# Patient Record
Sex: FEMALE | Race: WHITE | HISPANIC OR LATINO | Employment: FULL TIME | URBAN - METROPOLITAN AREA
[De-identification: names, ages, dates, MRNs, and addresses within clinical notes are randomized per-mention and may not be internally consistent; named-entity substitution may affect disease eponyms.]

---

## 2023-06-05 ENCOUNTER — PROCEDURE VISIT (OUTPATIENT)
Dept: OBGYN CLINIC | Facility: CLINIC | Age: 43
End: 2023-06-05

## 2023-06-05 VITALS
BODY MASS INDEX: 27.48 KG/M2 | DIASTOLIC BLOOD PRESSURE: 72 MMHG | SYSTOLIC BLOOD PRESSURE: 127 MMHG | HEART RATE: 57 BPM | HEIGHT: 60 IN | WEIGHT: 140 LBS

## 2023-06-05 DIAGNOSIS — R87.612 LOW GRADE SQUAMOUS INTRAEPITHELIAL LESION ON CYTOLOGIC SMEAR OF CERVIX (LGSIL): Primary | ICD-10-CM

## 2023-06-05 DIAGNOSIS — Z23 NEED FOR HPV VACCINATION: ICD-10-CM

## 2023-06-05 LAB — SL AMB POCT URINE HCG: NEGATIVE

## 2023-06-05 PROCEDURE — 90471 IMMUNIZATION ADMIN: CPT | Performed by: OBSTETRICS & GYNECOLOGY

## 2023-06-05 PROCEDURE — 57454 BX/CURETT OF CERVIX W/SCOPE: CPT | Performed by: OBSTETRICS & GYNECOLOGY

## 2023-06-05 PROCEDURE — 90651 9VHPV VACCINE 2/3 DOSE IM: CPT | Performed by: OBSTETRICS & GYNECOLOGY

## 2023-06-05 PROCEDURE — 88305 TISSUE EXAM BY PATHOLOGIST: CPT | Performed by: PATHOLOGY

## 2023-06-05 PROCEDURE — 81025 URINE PREGNANCY TEST: CPT | Performed by: OBSTETRICS & GYNECOLOGY

## 2023-06-05 NOTE — PROGRESS NOTES
[facility]  PROCEDURE VISIT    SUBJECTIVE:  HPI: Mai Cuba is a 38 y o   female who presents for colposcopy  Prior cervical cancer screening history:  10/3/2016: Pap - ASCUS, HPV Other HR +  11/3/2016: Colpo - Negative    3/6/2018: HSIL/AGC, HPV Other HR +  3/22/2018: Colpo - SOCO I, SOCO II/SOCO III  2018: LEEP - SOCO II/III   6/10/2020: Pap - ASCUS, cannot exclude HSIL; HPV Other HR +  2020: Colpo - SOCO II  2020: LEEP - SOCO I, SOCO II/III  2021: NILM, HPV no result   4/10/23: LSIL, HPV neg    She is not a smoker  She is using Mirena IUD for contraception  She is not previously HPV vaccinated  Past Medical History:   Diagnosis Date   • Abnormal Pap smear of cervix    • Bleeding gums    • Dental caries    • HPV (human papilloma virus) infection      Past Surgical History:   Procedure Laterality Date   • CERVICAL BIOPSY  W/ LOOP ELECTRODE EXCISION     • COLPOSCOPY     • DE COLPOSCOPY CERVIX VAG LOOP ELTRD BX CERVIX N/A 2018    Procedure: BIOPSY LEEP CERVIX;  Surgeon: Darius Aguilar MD;  [facility];  Service: Gynecology   • DE COLPOSCOPY CERVIX VAG LOOP ELTRD BX CERVIX N/A 2020    Procedure: BIOPSY LEEP CERVIX;  Surgeon: Dina March MD;  Location: [facility];  Service: Gynecology   • REMOVAL OF INTRAUTERINE DEVICE (IUD) N/A 2020    Procedure: REMOVAL OF INTRAUTERINE DEVICE (IUD); Surgeon: Dina March MD;  Location: [facility];  Service: Gynecology         OBJECTIVE:  Vitals:    23 0802   BP: 127/72   Pulse: 57   Weight: 63 5 kg (140 lb)   Height: 5' (1 524 m)       Physical Exam  Vitals reviewed  Constitutional:       General: She is not in acute distress  Appearance: She is well-developed  HENT:      Head: Normocephalic and atraumatic  Eyes:      Conjunctiva/sclera: Conjunctivae normal    Cardiovascular:      Rate and Rhythm: Normal rate     Pulmonary:      Effort: Pulmonary effort is normal  Abdominal:      General: There is no distension  Palpations: Abdomen is soft  Tenderness: There is no abdominal tenderness  Genitourinary:     Comments: Normal appearing external genitalia, vaginal mucosa  Evidence of cervix almost flush with the vaginal apex  No evidence of mass, lesion, or injury  No evidence of vaginal bleeding  IUD strings visualized  Normal physiologic discharge in the vaginal vault  Musculoskeletal:         General: Normal range of motion  Skin:     General: Skin is warm and dry  Neurological:      General: No focal deficit present  Mental Status: She is alert and oriented to person, place, and time  Mental status is at baseline  Psychiatric:         Mood and Affect: Mood normal          Behavior: Behavior normal          Thought Content: Thought content normal          Recent Results (from the past 12 hour(s))   POCT urine HCG    Collection Time: 06/05/23  8:14 AM   Result Value Ref Range    URINE HCG negative           Colposcopy     Date/Time 6/5/2023 8:11 AM     [place] Protocol   Procedure performed by: (Dr Sisi Bonilla)  Consent: Verbal consent obtained  Written consent obtained    Risks and benefits: risks, benefits and alternatives were discussed  Consent given by: patient  Patient understanding: patient states understanding of the procedure being performed  Patient consent: the patient's understanding of the procedure matches consent given  Procedure consent: procedure consent matches procedure scheduled  Relevant documents: relevant documents present and verified  Test results: test results available and properly labeled  Required items: required blood products, implants, devices, and special equipment available  Patient identity confirmed: verbally with patient       Performed by  Philip Cramen MD   Authorized by Philip Carmen MD       Pre-procedure details     Prepped with: acetic acid       Indication    LSIL (LSIL, HPV neg)     Procedure Details Procedure: Colposcopy w/ cervical biopsy and ECC      Under satisfactory analgesia the patient was prepped and draped in the dorsal lithotomy position: yes      Humble speculum was placed in the vagina: yes      Under colposcopic examination the transition zone was seen in entirety: no      Endocervix was curetted using a Kevorkian curette: yes      Cervical biopsy performed with a cervical biopsy punch: yes      Monsel's solution was applied: yes      Biopsy(s): yes      Location:  3:00, 6:00, 9:00, 12:00     Specimen to pathology: yes       Post-procedure      Findings: White epithelium      Impression: Low grade cervical dysplasia      Patient tolerance of procedure: Tolerated well, no immediate complications     Comments       Very difficult colposcopy due to redundant vaginal wall tissue and cervix almost flush with the vaginal apex  IUD strings visualized, inadvertently trimmed during procedure during attempts to obtain biopsies  Still with IUD strings visible outside the cervix  ASSESSMENT:  Sudarshan Reid is a 38 y o   female who presents for colposcpy  Tolerated well  Difficult procedure due to hx LEEP x 2 and cervix almost flush with the apex  Further management pending results  Patient is not previously HPV vaccinated  Would be interested in receiving vaccine today       PLAN:  - HPV vaccine today; RTC for 2nd and 3rd doses   - F/u colposcopy results     Jan Cho MD   PGY-4, OBGYN  23

## 2023-06-09 PROCEDURE — 88305 TISSUE EXAM BY PATHOLOGIST: CPT | Performed by: PATHOLOGY

## 2023-07-17 ENCOUNTER — HOSPITAL ENCOUNTER (OUTPATIENT)
Dept: MAMMOGRAPHY | Facility: IMAGING CENTER | Age: 43
Discharge: HOME | End: 2023-07-17

## 2023-07-17 VITALS — BODY MASS INDEX: 26.7 KG/M2 | HEIGHT: 60 IN | WEIGHT: 136 LBS

## 2023-07-17 DIAGNOSIS — Z12.31 ENCOUNTER FOR SCREENING MAMMOGRAM FOR MALIGNANT NEOPLASM OF BREAST: ICD-10-CM

## 2023-07-17 PROCEDURE — 77067 SCR MAMMO BI INCL CAD: CPT

## 2023-07-17 PROCEDURE — 77063 BREAST TOMOSYNTHESIS BI: CPT

## 2023-08-07 ENCOUNTER — CLINICAL SUPPORT (OUTPATIENT)
Dept: OBGYN CLINIC | Facility: CLINIC | Age: 43
End: 2023-08-07

## 2023-08-07 DIAGNOSIS — Z23 NEED FOR HPV VACCINATION: Primary | ICD-10-CM

## 2023-08-07 PROCEDURE — 90651 9VHPV VACCINE 2/3 DOSE IM: CPT

## 2023-08-07 PROCEDURE — 90471 IMMUNIZATION ADMIN: CPT

## 2023-10-16 ENCOUNTER — OFFICE VISIT (OUTPATIENT)
Dept: DENTISTRY | Facility: CLINIC | Age: 43
End: 2023-10-16

## 2023-10-16 DIAGNOSIS — K02.62 CARIES OF DENTIN: Primary | ICD-10-CM

## 2023-10-16 PROCEDURE — D2392 RESIN-BASED COMPOSITE - 2 SURFACES, POSTERIOR: HCPCS

## 2023-10-16 NOTE — DENTAL PROCEDURE DETAILS
Composite Filling #5 DO  Pt presents for a dental restoration and verbally consents for treatment:    Reviewed health history-  Pt is ASA type I   Pain Scale: 0/10- no pain reported    Discussed with patient need for RCT if pulp exposure occurs or in future if pulp is inflamed. Pt understands and consents. Dx:  #5 DO caries    Tx:  Applied topical benzocaine, administered 0.5 carpule 4% Septocaine 1:100k epi via B Infiltration    #5 DO:  Prepped tooth #19 MO with 245 carbide on high speed. Caries removed with round carbide on slow speed. Placed tofflemire matrix. Isolation with cotton rolls. Limelite cured. Etch with 37% H2PO4, rinse, dry. Applied Adhese with 20 second scrub once, gentle air dry and light cured for 10s. Restored with Tetric bulk meagan shade A2 and light cured. Refined with finishing burs, polished with enhance point. Verified occlusion. Pt left satisfied and ambulatory.     Attending: Dr Yvette Puentes    NV:  Recall

## 2023-10-16 NOTE — DENTAL PROCEDURE DETAILS
Patient presents for a dental restoration and verbally consents for treatment:  Reviewed health history-  Pt is ASA type {Milo numeral i-vi:67389}  Treatment consents signed: Yes  Perio: {WIS Healthy/Gingivitis/Periodontitis:097771911}  Pain Scale: {NUMBERS 0-10:14743}  Caries Assessment: {Low, Medium, MOLI:96309}    Radiographs: Films are current  Oral Hygiene instruction reviewed and given  Hygiene recall visits recommended to the patient    Patient agrees with the diagnosis of Caries and the proposed treatment plan for the resin restoration:  Tooth #{dnt general tooth numbers 1-32:20101}  Dental Anesthesia:  1.7 ml 3% carbo no epi. Material:   Etch Ivoclar bond and resin   Shade: {dnt shade:20085}    Prognosis is Good.    Referrals Needed: No  Next visit: Visit date not found

## 2023-11-27 ENCOUNTER — OFFICE VISIT (OUTPATIENT)
Dept: DENTISTRY | Facility: CLINIC | Age: 43
End: 2023-11-27

## 2023-11-27 VITALS — HEART RATE: 51 BPM | DIASTOLIC BLOOD PRESSURE: 68 MMHG | SYSTOLIC BLOOD PRESSURE: 114 MMHG

## 2023-11-27 DIAGNOSIS — K02.62 CARIES OF DENTIN: Primary | ICD-10-CM

## 2023-11-27 PROCEDURE — D2392 RESIN-BASED COMPOSITE - 2 SURFACES, POSTERIOR: HCPCS

## 2023-11-27 NOTE — DENTAL PROCEDURE DETAILS
Jered Rdz is a 42 y/o F that presents to [facility] for #3-MO dental restoration and verbally consents for treatment:  Reviewed health history-  Pt is ASA type II  Treatment consents signed: Yes  Perio: Localized Periodontitis  Pain Scale: 0  Caries Assessment: High    Radiographs: Films are current (FMX 7/27/22)  Oral Hygiene instruction reviewed and given  Hygiene recall visits recommended to the patient    Patient agrees with the diagnosis of Caries and the proposed treatment plan for the resin restoration: #3-MO composite resin   Discussed with patient need for RCT if pulp exposure occurs or in future if pulp is inflamed. Pt understands and consents. Applied topical benzocaine, administered 0.5 carps 4% articaine 1:100k epi via buccal local infiltration. Prepped tooth #3-MO with 245 carbide on high speed. Caries removed with round carbide on slow speed. No contact is present. Isolation with cotton rolls. Etch with 37% H2PO4, rinse, dry. Applied Adhese with 20 second scrub once, gentle air dry and light cured for 10s. Restored with Tetric flowable and bulk meagan shade A2 and light cured. Refined with finishing burs, polished with enhance point. Verified occlusion. Patient left satisfied and ambulatory. Attending: Dr. Ronald Pratt  NV: 6 [facility], prophylaxis (patient says she is due for prophy)    GILLIAN Newby

## 2023-12-11 ENCOUNTER — CLINICAL SUPPORT (OUTPATIENT)
Dept: OBGYN CLINIC | Facility: CLINIC | Age: 43
End: 2023-12-11

## 2023-12-11 DIAGNOSIS — Z23 NEED FOR HPV VACCINATION: Primary | ICD-10-CM

## 2023-12-11 PROCEDURE — 90471 IMMUNIZATION ADMIN: CPT

## 2023-12-11 PROCEDURE — 90651 9VHPV VACCINE 2/3 DOSE IM: CPT

## 2023-12-28 ENCOUNTER — OFFICE VISIT (OUTPATIENT)
Dept: DENTISTRY | Facility: CLINIC | Age: 43
End: 2023-12-28

## 2023-12-28 VITALS — DIASTOLIC BLOOD PRESSURE: 73 MMHG | HEART RATE: 50 BPM | SYSTOLIC BLOOD PRESSURE: 115 MMHG

## 2023-12-28 DIAGNOSIS — Z01.21 ENCOUNTER FOR DENTAL EXAMINATION AND CLEANING WITH ABNORMAL FINDINGS: Primary | ICD-10-CM

## 2023-12-28 PROCEDURE — D4910 PERIODONTAL MAINTENANCE: HCPCS

## 2023-12-28 PROCEDURE — D0120 PERIODIC ORAL EVALUATION - ESTABLISHED PATIENT: HCPCS

## 2023-12-28 PROCEDURE — D0274 BITEWINGS - 4 RADIOGRAPHIC IMAGES: HCPCS

## 2023-12-28 NOTE — DENTAL PROCEDURE DETAILS
PERIODIC EXAM, PERIO MT, 4BWX   REVIEWED MED HX: meds, allergies, health changes reviewed in [facility]. All consents signed.  CHIEF CONCERN:  Interested in tooth replacement #4 and #19  PAIN SCALE:  0  ASA CLASS:  2  PLAQUE:  mild      CALCULUS:  moderate, areas of black subcalc  BLEEDING:   moderate loc where calc present  STAIN :   light  ORAL HYGIENE:   fair    PERIO: updated probes, loc 4-6mm around 2nd and 3rd molars. 3rd molars are unerupted but just under gingival margin.    Hand scaled, polished and flossed. Used Cavitron.     Oral Hygiene Instruction:  recommended brushing 2 x daily for 2 minutes MIN, recommended flossing daily, reviewed dietary precautions.  Dispensed: toothbrush, toothpaste and floss    Visual and Tactile Intraoral/ Extraoral evaluation: Oral and Oropharyngeal cancer evaluation. No findings     Dr. Newby  exam=   Reviewed with patient clinical and radiographic findings and patient verbalized understanding. All questions and concerns addressed.     REFERRALS: no referrals needed at this time    CARIES FINDINGS:   2 O  15 MO       TREATMENT  PLAN :   1) 15 MO, discuss tooth replacement w/ Dr Newby  2) 2 O  Next Recall: 4 month recall     Last BWX: 12-28-23  Last Panorex/ FMX : 7-27-22

## 2024-03-25 ENCOUNTER — OFFICE VISIT (OUTPATIENT)
Dept: DENTISTRY | Facility: CLINIC | Age: 44
End: 2024-03-25

## 2024-03-25 VITALS — DIASTOLIC BLOOD PRESSURE: 82 MMHG | HEART RATE: 50 BPM | SYSTOLIC BLOOD PRESSURE: 119 MMHG

## 2024-03-25 DIAGNOSIS — K02.9 DENTAL CARIES: Primary | ICD-10-CM

## 2024-03-25 PROCEDURE — D2392 RESIN-BASED COMPOSITE - 2 SURFACES, POSTERIOR: HCPCS

## 2024-03-25 NOTE — DENTAL PROCEDURE DETAILS
Patient presents for a dental restoration and verbally consents for treatment:  Reviewed health history-  Pt is ASA type II  Treatment consents signed: Yes  Perio: Periodontitis  Pain Scale: 0  Caries Assessment: High    Radiographs: Films are current    Informed pt of size of caries and warned that it is very deep. Warned of possible risk of pulp inflammation causing RCT. Pt understands.     Patient agrees with the diagnosis of Caries and the proposed treatment plan for the resin restoration:  Tooth ##15 MO  Dental Anesthesia:  1.7 ml 2% Lidocaine w/1:100k epi given PSA block.  Dry Shield placed  Prepared ideal class 2 prep with occlusal dovetail using 557 bur on high speed. Used slow speed #8 round bur to excavate decay on walls and pulpal floor. Refined prep for retention and resistance form.     Tofflemire and wedge placed  Material: Limelight placed on deepest portion on pulpal floor and cured. Selective acid etch and rinse, Ivoclar maxwell scrubbed in and cured, and Tetric evoflow and evoceram resin packed and cured in increments   Shade: Shade A2  Checked occlusion and contacts and refined with finishing burs    Prognosis is Guarded.   Referrals Needed: No  Next visit: 4/1/2024 Everardo Carrasco DDS  #2 Occlusal resin

## 2024-04-01 ENCOUNTER — OFFICE VISIT (OUTPATIENT)
Dept: DENTISTRY | Facility: CLINIC | Age: 44
End: 2024-04-01

## 2024-04-01 VITALS — TEMPERATURE: 98.6 F

## 2024-04-01 DIAGNOSIS — Z01.21 ENCOUNTER FOR DENTAL EXAMINATION AND CLEANING WITH ABNORMAL FINDINGS: Primary | ICD-10-CM

## 2024-04-01 PROCEDURE — D2391 RESIN-BASED COMPOSITE - 1 SURFACE, POSTERIOR: HCPCS | Performed by: DENTIST

## 2024-04-01 NOTE — DENTAL PROCEDURE DETAILS
Patient presents for a dental restoration and verbally consents for treatment:  Reviewed health history-  Pt is ASA type II  Treatment consents signed: Yes  Perio: Healthy  Pain Scale: 0  Caries Assessment: High    Radiographs: Films are current  Oral Hygiene instruction reviewed and given  Hygiene recall visits recommended to the patient    Patient agrees with the diagnosis of Caries and the proposed treatment plan for the resin restoration:  Tooth ##2  Dental Anesthesia: No.  Material:   Etch Ivoclar bond and resin   Shade: Shade A2    Prognosis is Good.   Referrals Needed: No  Next visit: Recall visits

## 2024-04-01 NOTE — PROGRESS NOTES
Patient presents for a dental restoration and verbally consents for treatment:  Reviewed health history-  Pt is ASA type II  Treatment consents signed: Yes  Perio: Healthy  Pain Scale: 0  Caries Assessment: High    Radiographs: Films are current  Oral Hygiene instruction reviewed and given  Hygiene recall visits recommended to the patient     Patient agrees with the diagnosis of Caries and the proposed treatment plan for the resin restoration:  Tooth ##2 (O)  Dental Anesthesia: No.  Material:   Etch Ivoclar bond and resin   Shade: Shade A2   Post op instructions given  Prognosis is Good.   Referrals Needed: No  Next visit: Recall visits

## 2024-05-20 ENCOUNTER — OFFICE VISIT (OUTPATIENT)
Dept: DENTISTRY | Facility: CLINIC | Age: 44
End: 2024-05-20

## 2024-05-20 VITALS — DIASTOLIC BLOOD PRESSURE: 64 MMHG | HEART RATE: 68 BPM | SYSTOLIC BLOOD PRESSURE: 114 MMHG

## 2024-05-20 DIAGNOSIS — Z01.20 VISIT FOR DENTAL EXAMINATION: Primary | ICD-10-CM

## 2024-05-20 PROCEDURE — D0367 CONE BEAM CT CAPTURE AND INTERPRETATION WITH FIELD OF VIEW OF BOTH JAWS; WITH OR WITHOUT CRANIUM: HCPCS

## 2024-05-22 NOTE — DENTAL PROCEDURE DETAILS
Leonela Rodger Viraj presents to [facility] dental clinic for implant consultation. H&P reviewed with no changes. Pain 0/10, ASA class II.    Patient is primary interested in implant for site #4 which presents aesthetic concerns. She is concerned about the .     PA taken #4 showing no residual bony defect from extraction.     CBCT taken. Interpreted scan with Dr. Hallman. Patient has adequate bone for at least 3.8 x 10mm implant in this space.     Explained options for replacing this tooth to patient: implant vs bridge. Explained that implant is preferred option as teeth #s 3 and 5 are healthy, unrestored teeth and bridge preparation will require removing significant amount of tooth structure from these teeth which may shorten their lifespan or cause complications such as a pulpal exposure.     Patient would like to think about her options and will call to schedule implant if she is interested.    NV: Recall, implant when ready to proceed

## 2025-04-21 ENCOUNTER — OFFICE VISIT (OUTPATIENT)
Dept: INTERNAL MEDICINE CLINIC | Facility: CLINIC | Age: 45
End: 2025-04-21

## 2025-04-21 VITALS
WEIGHT: 153.2 LBS | HEART RATE: 65 BPM | DIASTOLIC BLOOD PRESSURE: 72 MMHG | SYSTOLIC BLOOD PRESSURE: 111 MMHG | TEMPERATURE: 98.5 F | BODY MASS INDEX: 30.08 KG/M2 | HEIGHT: 60 IN

## 2025-04-21 DIAGNOSIS — Z23 ENCOUNTER FOR IMMUNIZATION: ICD-10-CM

## 2025-04-21 DIAGNOSIS — M54.50 LOWER BACK PAIN: ICD-10-CM

## 2025-04-21 DIAGNOSIS — G47.00 INSOMNIA: ICD-10-CM

## 2025-04-21 DIAGNOSIS — R07.89 NON-CARDIAC CHEST PAIN: ICD-10-CM

## 2025-04-21 DIAGNOSIS — Z12.4 SCREENING FOR CERVICAL CANCER: ICD-10-CM

## 2025-04-21 DIAGNOSIS — M79.601 PAIN IN BOTH UPPER EXTREMITIES: ICD-10-CM

## 2025-04-21 DIAGNOSIS — Z11.59 NEED FOR HEPATITIS C SCREENING TEST: ICD-10-CM

## 2025-04-21 DIAGNOSIS — Z13.1 SCREENING FOR DIABETES MELLITUS: ICD-10-CM

## 2025-04-21 DIAGNOSIS — Z13.220 SCREENING FOR HYPERLIPIDEMIA: ICD-10-CM

## 2025-04-21 DIAGNOSIS — M79.602 PAIN IN BOTH UPPER EXTREMITIES: ICD-10-CM

## 2025-04-21 DIAGNOSIS — Z00.00 ANNUAL PHYSICAL EXAM: Primary | ICD-10-CM

## 2025-04-21 DIAGNOSIS — Z12.31 ENCOUNTER FOR SCREENING MAMMOGRAM FOR BREAST CANCER: ICD-10-CM

## 2025-04-21 PROCEDURE — 99386 PREV VISIT NEW AGE 40-64: CPT | Performed by: STUDENT IN AN ORGANIZED HEALTH CARE EDUCATION/TRAINING PROGRAM

## 2025-04-21 PROCEDURE — 90471 IMMUNIZATION ADMIN: CPT

## 2025-04-21 PROCEDURE — 90715 TDAP VACCINE 7 YRS/> IM: CPT

## 2025-04-21 PROCEDURE — 93000 ELECTROCARDIOGRAM COMPLETE: CPT | Performed by: STUDENT IN AN ORGANIZED HEALTH CARE EDUCATION/TRAINING PROGRAM

## 2025-04-21 RX ORDER — TRAZODONE HYDROCHLORIDE 50 MG/1
50 TABLET ORAL
Qty: 30 TABLET | Refills: 0 | Status: SHIPPED | OUTPATIENT
Start: 2025-04-21

## 2025-04-21 NOTE — PATIENT INSTRUCTIONS
"Patient Education     Routine physical for adults   The Basics   Written by the doctors and editors at [facility]   What is a physical? -- A physical is a routine visit, or \"check-up,\" with your doctor. You might also hear it called a \"wellness visit\" or \"preventive visit.\"  During each visit, the doctor will:   Ask about your physical and mental health   Ask about your habits, behaviors, and lifestyle   Do an exam   Give you vaccines if needed   Talk to you about any medicines you take   Give advice about your health   Answer your questions  Getting regular check-ups is an important part of taking care of your health. It can help your doctor find and treat any problems you have. But it's also important for preventing health problems.  A routine physical is different from a \"sick visit.\" A sick visit is when you see a doctor because of a health concern or problem. Since physicals are scheduled ahead of time, you can think about what you want to ask the doctor.  How often should I get a physical? -- It depends on your age and health. In general, for people age 21 years and older:   If you are younger than 50 years, you might be able to get a physical every 3 years.   If you are 50 years or older, your doctor might recommend a physical every year.  If you have an ongoing health condition, like diabetes or high blood pressure, your doctor will probably want to see you more often.  What happens during a physical? -- In general, each visit will include:   Physical exam - The doctor or nurse will check your height, weight, heart rate, and blood pressure. They will also look at your eyes and ears. They will ask about how you are feeling and whether you have any symptoms that bother you.   Medicines - It's a good idea to bring a list of all the medicines you take to each doctor visit. Your doctor will talk to you about your medicines and answer any questions. Tell them if you are having any side effects that bother you. You " "should also tell them if you are having trouble paying for any of your medicines.   Habits and behaviors - This includes:   Your diet   Your exercise habits   Whether you smoke, drink alcohol, or use drugs   Whether you are sexually active   Whether you feel safe at home  Your doctor will talk to you about things you can do to improve your health and lower your risk of health problems. They will also offer help and support. For example, if you want to quit smoking, they can give you advice and might prescribe medicines. If you want to improve your diet or get more physical activity, they can help you with this, too.   Lab tests, if needed - The tests you get will depend on your age and situation. For example, your doctor might want to check your:   Cholesterol   Blood sugar   Iron level   Vaccines - The recommended vaccines will depend on your age, health, and what vaccines you already had. Vaccines are very important because they can prevent certain serious or deadly infections.   Discussion of screening - \"Screening\" means checking for diseases or other health problems before they cause symptoms. Your doctor can recommend screening based on your age, risk, and preferences. This might include tests to check for:   Cancer, such as breast, prostate, cervical, ovarian, colorectal, prostate, lung, or skin cancer   Sexually transmitted infections, such as chlamydia and gonorrhea   Mental health conditions like depression and anxiety  Your doctor will talk to you about the different types of screening tests. They can help you decide which screenings to have. They can also explain what the results might mean.   Answering questions - The physical is a good time to ask the doctor or nurse questions about your health. If needed, they can refer you to other doctors or specialists, too.  Adults older than 65 years often need other care, too. As you get older, your doctor will talk to you about:   How to prevent falling at " home   Hearing or vision tests   Memory testing   How to take your medicines safely   Making sure that you have the help and support you need at home  All topics are updated as new evidence becomes available and our peer review process is complete.  This topic retrieved from [employer] on: May 02, 2024.  Topic 715710 Version 1.0  Release: 32.4.3 - C32.122  © 2024 UpToDate, Inc. and/or its affiliates. All rights reserved.  Consumer Information Use and Disclaimer   Disclaimer: This generalized information is a limited summary of diagnosis, treatment, and/or medication information. It is not meant to be comprehensive and should be used as a tool to help the user understand and/or assess potential diagnostic and treatment options. It does NOT include all information about conditions, treatments, medications, side effects, or risks that may apply to a specific patient. It is not intended to be medical advice or a substitute for the medical advice, diagnosis, or treatment of a health care provider based on the health care provider's examination and assessment of a patient's specific and unique circumstances. Patients must speak with a health care provider for complete information about their health, medical questions, and treatment options, including any risks or benefits regarding use of medications. This information does not endorse any treatments or medications as safe, effective, or approved for treating a specific patient. UpToDate, Inc. and its affiliates disclaim any warranty or liability relating to this information or the use thereof.The use of this information is governed by the Terms of Use, available at https://www.woltersAuthentidate Holdinguwer.com/en/know/clinical-effectiveness-terms. 2024© UpToDate, Inc. and its affiliates and/or licensors. All rights reserved.  Copyright   © 2024 UpToDate, Inc. and/or its affiliates. All rights reserved.

## 2025-04-21 NOTE — PROGRESS NOTES
Adult Annual Physical  Name: Leonela Cali      : 1980      MRN: [id number]  Encounter Provider: Jose Mosqueda MD  Encounter Date: 2025   Encounter department: [facility] [place]    :  Assessment & Plan  Annual physical exam         Pain in both upper extremities  Cramping and aching pain from C-spine radiating down both arms bilaterally.  No associating aggravating or alleviating factors.  Has tried over-the-counter NSAIDs without much relief.  No alarm symptoms on history.  Does have mild focal C spinous process tenderness.  No step off appreciated.  Strength 5/5 both upper extremities bilaterally.  Pain reproduced with strength testing.  Likely cervical spondylosis or radiculopathy but concern for fracture/spondylolysis given point tenderness on exam.  Other differential diagnoses include osteomyelitis, malignancy, epidural abscess although these are unlikely.  Will obtain CT cervical spine to assess for any bony pathology and obtain Mg, Phos, VitD to rule out any electrolyte/vitamin deficiency cause of pain.     Orders:    Magnesium; Future    Phosphorus; Future    Vitamin D 25 hydroxy; Future    CT spine cervical wo contrast; Future    TSH + Free T4; Future    Lower back pain  Midline lower back pain with radiation to bilateral lower extremities, similar to upper extremity pain.  Also with focal tenderness around L4-5 spinous process.  Will obtain XR lumbar spine to rule out fractures or bony pathology in addition to the above  Orders:    XR spine lumbar 2 or 3 views injury; Future    Screening for cervical cancer  History of LSIL and had colposcopy done in  and was directed to repeat in 1 year but did not follow-up.  Orders:    Ambulatory referral to Obstetrics / Gynecology; Future    Encounter for immunization    Orders:    Tdap Vaccine greater than or equal to 6yo    Need for hepatitis C screening test    Orders:    Hepatitis C Antibody;  Future    Encounter for screening mammogram for breast cancer    Orders:    Mammo screening bilateral w 3d and cad; Future    Insomnia  Reports waking up from sleep around midnight to 3 AM and unable to fall back asleep.  Has been taking Benadryl which only causes grogginess in the morning.  Has also tried melatonin without any benefit.  Does report significant stressors and anxiety in her life which may be contributing.  Recently started therapy for depression/anxiety symptoms.  Recommended trying low dose trazodone to see if this improves sleep.  And we will follow-up in about 6 weeks to reassess.  Orders:    traZODone (DESYREL) 50 mg tablet; Take 1 tablet (50 mg total) by mouth daily at bedtime    CBC and Platelet; Future    Comprehensive metabolic panel; Future    Vitamin B12/Folate, Serum Panel; Future    TSH + Free T4; Future    Non-cardiac chest pain  Random onset of nonradiating pressure-like chest pain lasting only a few minutes.  No correlation with exertion, position, deep breathing.  Not associated with lightheadedness, palpitations, shortness of breath. episodes began around the same time she started experiencing upper and lower extremity pain.  POC EKG showing sinus bradycardia but otherwise unremarkable.  CP may be MSK or anxiety related and is unlikely cardiac.  Orders:    POCT ECG    Screening for diabetes mellitus    Orders:    Hemoglobin A1C; Future    Screening for hyperlipidemia    Orders:    Lipid Panel with Direct LDL reflex; Future    Screening for cervical cancer         Encounter for immunization         Need for hepatitis C screening test         Encounter for screening mammogram for breast cancer         Annual physical exam  Leonela Cali is a 44-year-old female no significant past medical history apart from SOCO presenting to [facility] care.  She does report having difficulty staying asleep at night and often wakes up in the middle of the night with racing thoughts and is unable to  fall back asleep.  Additionally, reports bilateral upper and lower extremity pain for the past month which radiates from the spine to upper and lower extremities.  Describes this as a cramping and soreness type of pain.  Has been taking OTC NSAIDs without relief.  Pain is not constant but will occur randomly throughout the day and is not necessarily associated with activity.  She denies fevers/chills, recent illness, headache, palpitations, shortness of breath, GI symptoms.             Preventive Screenings:  - Diabetes Screening: risks/benefits discussed and orders placed  - Cholesterol Screening: risks/benefits discussed and orders placed   - Hepatitis C screening: risks/benefits discussed and orders placed   - HIV screening: screening up-to-date and orders placed   - Cervical cancer screening: orders placed   - Breast cancer screening: screening up-to-date   - Colon cancer screening: screening not indicated   - Lung cancer screening: screening not indicated     Immunizations:  - Immunizations due: Influenza    BMI Counseling: Body mass index is 29.92 kg/m². The BMI is above normal. Nutrition recommendations include decreasing portion sizes and moderation in carbohydrate intake. Exercise recommendations include moderate physical activity 150 minutes/week. No pharmacotherapy was ordered. Patient referred to PCP. Rationale for BMI follow-up plan is due to patient being overweight or obese.     Depression Screening and Follow-up Plan: Patient was screened for depression during today's encounter. They screened negative with a PHQ-2 score of 2.          History of Present Illness     Adult Annual Physical:  Patient presents for annual physical.     Diet and Physical Activity:  - Diet/Nutrition: no special diet.  - Exercise: no formal exercise.    Depression Screening:  - PHQ-2 Score: 2    General Health:  - Sleep: sleeps poorly.  - Hearing: normal hearing right ear and normal hearing left ear.  - Vision: no vision  problems.  - Dental: no dental visits for > 1 year.    /GYN Health:  - Follows with GYN: no.   - Menopause: premenopausal.   - Contraception: IUD placement.      Review of Systems   Constitutional:  Negative for activity change, chills, diaphoresis, fatigue, fever and unexpected weight change.   HENT:  Negative for sinus pressure, tinnitus and trouble swallowing.    Eyes:  Negative for visual disturbance.   Respiratory:  Negative for cough, chest tightness, shortness of breath and wheezing.    Cardiovascular:  Positive for chest pain. Negative for palpitations and leg swelling.   Gastrointestinal:  Negative for abdominal distention, abdominal pain, blood in stool, constipation, diarrhea and vomiting.   Genitourinary:  Negative for difficulty urinating and dysuria.   Musculoskeletal:  Positive for back pain and neck pain.   Neurological:  Positive for light-headedness and headaches. Negative for tremors, weakness and numbness.   Psychiatric/Behavioral:  The patient is nervous/anxious.          Objective   /72 (BP Location: Right arm, Patient Position: Sitting, Cuff Size: Large)   Pulse 65   Temp 98.5 °F (36.9 °C) (Temporal)   Ht 5' (1.524 m)   Wt 69.5 kg (153 lb 3.2 oz)   BMI 29.92 kg/m²     Physical Exam  Constitutional:       Appearance: Normal appearance.   HENT:      Head: Normocephalic and atraumatic.      Right Ear: Tympanic membrane, ear canal and external ear normal.      Left Ear: Tympanic membrane, ear canal and external ear normal.      Nose: Nose normal. No congestion.      Mouth/Throat:      Mouth: Mucous membranes are moist.      Pharynx: Oropharynx is clear.   Eyes:      Extraocular Movements: Extraocular movements intact.      Conjunctiva/sclera: Conjunctivae normal.      Pupils: Pupils are equal, round, and reactive to light.   Cardiovascular:      Rate and Rhythm: Normal rate and regular rhythm.      Pulses: Normal pulses.      Heart sounds: Normal heart sounds.   Pulmonary:       Effort: Pulmonary effort is normal.      Breath sounds: Normal breath sounds.   Abdominal:      General: Abdomen is flat. Bowel sounds are normal.      Palpations: Abdomen is soft.   Musculoskeletal:         General: Normal range of motion.      Cervical back: Normal range of motion and neck supple. Tenderness present.      Lumbar back: Tenderness present.      Right lower leg: No edema.      Left lower leg: No edema.      Comments: Pain in biceps with flexion against resistance   Skin:     General: Skin is warm and dry.   Neurological:      General: No focal deficit present.      Mental Status: She is alert and oriented to person, place, and time.      Cranial Nerves: Cranial nerves 2-12 are intact.      Motor: No weakness.   Psychiatric:         Mood and Affect: Mood normal.         Behavior: Behavior normal.

## 2025-04-23 ENCOUNTER — APPOINTMENT (OUTPATIENT)
Dept: LAB | Facility: HOSPITAL | Age: 45
End: 2025-04-23
Payer: COMMERCIAL

## 2025-04-23 DIAGNOSIS — Z13.220 SCREENING FOR HYPERLIPIDEMIA: ICD-10-CM

## 2025-04-23 DIAGNOSIS — M79.602 PAIN IN BOTH UPPER EXTREMITIES: ICD-10-CM

## 2025-04-23 DIAGNOSIS — M79.601 PAIN IN BOTH UPPER EXTREMITIES: ICD-10-CM

## 2025-04-23 DIAGNOSIS — Z13.1 SCREENING FOR DIABETES MELLITUS: ICD-10-CM

## 2025-04-23 DIAGNOSIS — G47.00 INSOMNIA: ICD-10-CM

## 2025-04-23 DIAGNOSIS — Z11.59 NEED FOR HEPATITIS C SCREENING TEST: ICD-10-CM

## 2025-04-23 LAB
25(OH)D3 SERPL-MCNC: 21.5 NG/ML (ref 30–100)
ALBUMIN SERPL BCG-MCNC: 4.2 G/DL (ref 3.5–5)
ALP SERPL-CCNC: 58 U/L (ref 34–104)
ALT SERPL W P-5'-P-CCNC: 32 U/L (ref 7–52)
ANION GAP SERPL CALCULATED.3IONS-SCNC: 10 MMOL/L (ref 4–13)
AST SERPL W P-5'-P-CCNC: 19 U/L (ref 13–39)
BILIRUB SERPL-MCNC: 0.42 MG/DL (ref 0.2–1)
BUN SERPL-MCNC: 8 MG/DL (ref 5–25)
CALCIUM SERPL-MCNC: 9.1 MG/DL (ref 8.4–10.2)
CHLORIDE SERPL-SCNC: 107 MMOL/L (ref 96–108)
CHOLEST SERPL-MCNC: 190 MG/DL (ref ?–200)
CO2 SERPL-SCNC: 22 MMOL/L (ref 21–32)
CREAT SERPL-MCNC: 0.63 MG/DL (ref 0.6–1.3)
ERYTHROCYTE [DISTWIDTH] IN BLOOD BY AUTOMATED COUNT: 12.3 % (ref 11.6–15.1)
EST. AVERAGE GLUCOSE BLD GHB EST-MCNC: 108 MG/DL
FOLATE SERPL-MCNC: 21.7 NG/ML
GFR SERPL CREATININE-BSD FRML MDRD: 110 ML/MIN/1.73SQ M
GLUCOSE P FAST SERPL-MCNC: 99 MG/DL (ref 65–99)
HBA1C MFR BLD: 5.4 %
HCT VFR BLD AUTO: 40.9 % (ref 34.8–46.1)
HCV AB SER QL: NORMAL
HDLC SERPL-MCNC: 51 MG/DL
HGB BLD-MCNC: 13.7 G/DL (ref 11.5–15.4)
LDLC SERPL CALC-MCNC: 111 MG/DL (ref 0–100)
MAGNESIUM SERPL-MCNC: 2.2 MG/DL (ref 1.9–2.7)
MCH RBC QN AUTO: 31.4 PG (ref 26.8–34.3)
MCHC RBC AUTO-ENTMCNC: 33.5 G/DL (ref 31.4–37.4)
MCV RBC AUTO: 94 FL (ref 82–98)
PHOSPHATE SERPL-MCNC: 3 MG/DL (ref 2.7–4.5)
PLATELET # BLD AUTO: 334 THOUSANDS/UL (ref 149–390)
PMV BLD AUTO: 9.8 FL (ref 8.9–12.7)
POTASSIUM SERPL-SCNC: 4 MMOL/L (ref 3.5–5.3)
PROT SERPL-MCNC: 6.9 G/DL (ref 6.4–8.4)
RBC # BLD AUTO: 4.36 MILLION/UL (ref 3.81–5.12)
SODIUM SERPL-SCNC: 139 MMOL/L (ref 135–147)
T4 FREE SERPL-MCNC: 0.82 NG/DL (ref 0.61–1.12)
TRIGL SERPL-MCNC: 139 MG/DL (ref ?–150)
TSH SERPL DL<=0.05 MIU/L-ACNC: 1.63 UIU/ML (ref 0.45–4.5)
VIT B12 SERPL-MCNC: 158 PG/ML (ref 180–914)
WBC # BLD AUTO: 6.94 THOUSAND/UL (ref 4.31–10.16)

## 2025-04-23 PROCEDURE — 83036 HEMOGLOBIN GLYCOSYLATED A1C: CPT

## 2025-04-23 PROCEDURE — 36415 COLL VENOUS BLD VENIPUNCTURE: CPT

## 2025-04-23 PROCEDURE — 84100 ASSAY OF PHOSPHORUS: CPT

## 2025-04-23 PROCEDURE — 84439 ASSAY OF FREE THYROXINE: CPT

## 2025-04-23 PROCEDURE — 84443 ASSAY THYROID STIM HORMONE: CPT

## 2025-04-23 PROCEDURE — 82607 VITAMIN B-12: CPT

## 2025-04-23 PROCEDURE — 80061 LIPID PANEL: CPT

## 2025-04-23 PROCEDURE — 82746 ASSAY OF FOLIC ACID SERUM: CPT

## 2025-04-23 PROCEDURE — 80053 COMPREHEN METABOLIC PANEL: CPT

## 2025-04-23 PROCEDURE — 85027 COMPLETE CBC AUTOMATED: CPT

## 2025-04-23 PROCEDURE — 83735 ASSAY OF MAGNESIUM: CPT

## 2025-04-23 PROCEDURE — 86803 HEPATITIS C AB TEST: CPT

## 2025-04-23 PROCEDURE — 82306 VITAMIN D 25 HYDROXY: CPT

## 2025-04-24 ENCOUNTER — RESULTS FOLLOW-UP (OUTPATIENT)
Dept: INTERNAL MEDICINE CLINIC | Facility: CLINIC | Age: 45
End: 2025-04-24

## 2025-04-24 DIAGNOSIS — E55.9 VITAMIN D DEFICIENCY: ICD-10-CM

## 2025-04-24 DIAGNOSIS — E53.8 VITAMIN B12 DEFICIENCY: Primary | ICD-10-CM

## 2025-04-24 RX ORDER — LANOLIN ALCOHOL/MO/W.PET/CERES
1000 CREAM (GRAM) TOPICAL DAILY
Qty: 90 TABLET | Refills: 1 | Status: SHIPPED | OUTPATIENT
Start: 2025-04-24

## 2025-04-24 NOTE — RESULT ENCOUNTER NOTE
Attempted to call patient regarding lab results but call was unable to be completed.  Her B12 and vitamin D levels were low which may partially explain her arm and leg pains.  Sent prescription for supplements to her pharmacy.

## 2025-05-12 ENCOUNTER — HOSPITAL ENCOUNTER (OUTPATIENT)
Dept: CT IMAGING | Facility: HOSPITAL | Age: 45
Discharge: HOME | End: 2025-05-12
Attending: STUDENT IN AN ORGANIZED HEALTH CARE EDUCATION/TRAINING PROGRAM

## 2025-05-12 DIAGNOSIS — M79.601 PAIN IN BOTH UPPER EXTREMITIES: ICD-10-CM

## 2025-05-12 DIAGNOSIS — M79.602 PAIN IN BOTH UPPER EXTREMITIES: ICD-10-CM

## 2025-05-12 PROCEDURE — 72125 CT NECK SPINE W/O DYE: CPT

## 2025-05-19 ENCOUNTER — ANNUAL EXAM (OUTPATIENT)
Dept: OBGYN CLINIC | Facility: CLINIC | Age: 45
End: 2025-05-19

## 2025-05-19 VITALS
WEIGHT: 154.8 LBS | HEART RATE: 57 BPM | BODY MASS INDEX: 30.39 KG/M2 | SYSTOLIC BLOOD PRESSURE: 125 MMHG | RESPIRATION RATE: 18 BRPM | HEIGHT: 60 IN | DIASTOLIC BLOOD PRESSURE: 79 MMHG

## 2025-05-19 DIAGNOSIS — Z12.4 CERVICAL CANCER SCREENING: Primary | ICD-10-CM

## 2025-05-19 DIAGNOSIS — Z11.3 SCREENING EXAMINATION FOR STD (SEXUALLY TRANSMITTED DISEASE): ICD-10-CM

## 2025-05-19 PROCEDURE — 87591 N.GONORRHOEAE DNA AMP PROB: CPT

## 2025-05-19 PROCEDURE — G0476 HPV COMBO ASSAY CA SCREEN: HCPCS

## 2025-05-19 PROCEDURE — 87491 CHLMYD TRACH DNA AMP PROBE: CPT

## 2025-05-19 PROCEDURE — G0145 SCR C/V CYTO,THINLAYER,RESCR: HCPCS

## 2025-05-19 NOTE — PROGRESS NOTES
OB/GYN ANNUAL H&P    SUBJECTIVE:    Leonela Cali is a 44 y.o.  female who presents for annual well woman H&P.    GYN:  She does not have periods since being on the Mirena IUD  Abnormal vaginal discharge: denies  Genital lesions: denies  Genital irritation or itching: denies  Sexually active: yes with   Dyspareunia: denies  Contraception: Mirena IUD  STI history: denies  Gynecologic surgical history: LEEPx1    OB:   female  Prior pregnancies uncomplicated    :  Dysuria: denies  Hematuria: denies  Urgency: denies  Frequency: denies  Urinary incontinence: denies    Breast:  Mass: denies  Skin changes: denies  Reddening: denies  Dimpling: denies  Pain: denies  Nipple discharge: denies    General:  Diet: good, balanced  Exercise: weight lifts 2x a week, walks  Work: [employer]   Alcohol use: socially  Tobacco use: denies  Recreational drug use: denies    Family history:  Breast cancer: denies  Uterine cancer: denies  Ovarian cancer: denies  Colon: denies    Screening:  Cervical cancer screening hx as follows  ASCUS in  followed by neg colpo  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, followed by colpo which showed CINII-III  ASCUS in  with + HPV high risk, followed by colpo which showed SOCO II  Underwent LEEP in  that showed HGSIL with neg margins  NILM with failed HPV testing in   LGSIL in  with - HPV testing, followed by colpo that was neg for dysplacia  Pap smear performed today  Breast cancer screening:  Last mammogram on 2023 showed Birads 1  Due for first routine screening mammogram at 43 yo  Colon cancer screening:  Due for first routine screening colonoscopy at 48 yo  STI screening: desires GC/CT    Immunizations:  Gardasil: received 3x doses    Review of Systems:  Pertinent items are noted in HPI.    OBJECTIVE:    Vitals:   /79 (BP Location: Right arm, Patient Position: Sitting, Cuff Size: Adult)   Pulse 57   Resp 18   Ht 5' (1.524 m)   Wt 70.2 kg  (154 lb 12.8 oz)   LMP  (LMP Unknown)   BMI 30.23 kg/m²     Physical Exam  Constitutional:       Appearance: Normal appearance.   Genitourinary:      Vulva, rectum and urethral meatus normal.      No lesions in the vagina.      Right Labia: No rash, tenderness, lesions, skin changes or Bartholin's cyst.     Left Labia: No tenderness, lesions, skin changes, Bartholin's cyst or rash.     No labial fusion noted.      Pelvic Deep Score: 5/5.     No vaginal discharge, erythema, tenderness, bleeding, ulceration or granulation tissue.      No vaginal prolapse present.     No vaginal atrophy present.       Right Adnexa: not tender, not full, no mass present and not absent.     Left Adnexa: not tender, not full, no mass present and not absent.     No cervical motion tenderness, discharge, friability, lesion, polyp, nabothian cyst or eversion.      Uterus is not enlarged, fixed, tender, irregular or prolapsed.      No uterine mass detected.     Uterus is anteverted.   Breasts:     Deep Score is 5.      Breasts are symmetrical.      Breasts are soft.     Right: Normal. No swelling, bleeding, inverted nipple, mass, nipple discharge, skin change, tenderness or breast implant.      Left: Normal. No swelling, bleeding, inverted nipple, mass, nipple discharge, skin change, tenderness or breast implant.   HENT:      Head: Atraumatic.     Eyes:      Extraocular Movements: Extraocular movements intact.      Conjunctiva/sclera: Conjunctivae normal.       Cardiovascular:      Rate and Rhythm: Normal rate and regular rhythm.      Pulses: Normal pulses.   Pulmonary:      Effort: Pulmonary effort is normal. No respiratory distress.      Breath sounds: Normal breath sounds.   Abdominal:      General: Abdomen is flat. There is no distension.      Palpations: Abdomen is soft.      Tenderness: There is no abdominal tenderness.     Musculoskeletal:      Cervical back: Normal range of motion.   Lymphadenopathy:      Upper Body:      Right  upper body: No supraclavicular or axillary adenopathy.      Left upper body: No supraclavicular or axillary adenopathy.     Neurological:      General: No focal deficit present.      Mental Status: She is alert and oriented to person, place, and time.     Skin:     General: Skin is warm and dry.     Psychiatric:         Mood and Affect: Mood normal.         Behavior: Behavior normal.   Vitals reviewed. Exam conducted with a chaperone present.         ASSESSMENT:    Leonela Cali is a 44 y.o.  with normal gynecologic exam.    PLAN:    -Pap smear collected today  -S/p Gardasil   -Up to date with mammogram  -IUD in place  -GC/CT at patient's request  -Return back in 1 year or sooner if issues arise    Dr. Robison aware    Myra Hdez MD  25  11:22 AM

## 2025-05-20 ENCOUNTER — PATIENT OUTREACH (OUTPATIENT)
Facility: HOSPITAL | Age: 45
End: 2025-05-20

## 2025-05-20 ENCOUNTER — TELEPHONE (OUTPATIENT)
Dept: OTHER | Facility: OTHER | Age: 45
End: 2025-05-20

## 2025-05-20 LAB
HPV HR 12 DNA CVX QL NAA+PROBE: NEGATIVE
HPV16 DNA CVX QL NAA+PROBE: NEGATIVE
HPV18 DNA CVX QL NAA+PROBE: NEGATIVE

## 2025-05-20 NOTE — PROGRESS NOTES
"Program details reviewed (Yes/No):Yes    Patient lives in [place]: (Yes/No):Yes    Uninsured/Underinsured(List):Uninsured    Patient Agreeable to Participation(Yes/No):Yes    Verbal Consent Given (Yes/No):Yes    Adagio Consent form signed \"verba consent\"(Yes/No):Yes    Patient Entered into [facility] (Yes/[facility]      "

## 2025-05-20 NOTE — TELEPHONE ENCOUNTER
Progress Note:  Left message for patient to return call to assist in rescheduling mammogram appointment location due to Stacy Moralez.     Mammogram Screening ([facility]):  Pap smear screening (Clinic vs [facility] vs [facility]):  PCP (Clinic vs [facility] vs [facility]):     Transportation:     Education:

## 2025-05-21 LAB
C TRACH DNA SPEC QL NAA+PROBE: NEGATIVE
N GONORRHOEA DNA SPEC QL NAA+PROBE: NEGATIVE

## 2025-05-22 ENCOUNTER — TELEPHONE (OUTPATIENT)
Dept: OTHER | Facility: OTHER | Age: 45
End: 2025-05-22

## 2025-05-22 LAB
LAB AP GYN PRIMARY INTERPRETATION: NORMAL
Lab: NORMAL

## 2025-05-22 NOTE — TELEPHONE ENCOUNTER
Progress Note:  Phone call from patient. Education provided. Requested RN schedule mammogram appointment.    Mammogram Screening ([facility]):  Pap smear screening (Clinic vs [facility] vs [facility]):  [facility]     Transportation:     Education:

## 2025-06-23 ENCOUNTER — OFFICE VISIT (OUTPATIENT)
Dept: INTERNAL MEDICINE CLINIC | Facility: CLINIC | Age: 45
End: 2025-06-23

## 2025-06-23 VITALS
HEIGHT: 61 IN | WEIGHT: 155 LBS | RESPIRATION RATE: 18 BRPM | OXYGEN SATURATION: 97 % | TEMPERATURE: 98.4 F | HEART RATE: 60 BPM | DIASTOLIC BLOOD PRESSURE: 72 MMHG | BODY MASS INDEX: 29.27 KG/M2 | SYSTOLIC BLOOD PRESSURE: 111 MMHG

## 2025-06-23 DIAGNOSIS — E55.9 VITAMIN D DEFICIENCY: ICD-10-CM

## 2025-06-23 DIAGNOSIS — G47.00 INSOMNIA: ICD-10-CM

## 2025-06-23 DIAGNOSIS — E53.8 VITAMIN B12 DEFICIENCY: ICD-10-CM

## 2025-06-23 PROCEDURE — 99213 OFFICE O/P EST LOW 20 MIN: CPT

## 2025-06-23 RX ORDER — LANOLIN ALCOHOL/MO/W.PET/CERES
1000 CREAM (GRAM) TOPICAL DAILY
Qty: 90 TABLET | Refills: 1 | Status: SHIPPED | OUTPATIENT
Start: 2025-06-23

## 2025-06-23 RX ORDER — TRAZODONE HYDROCHLORIDE 50 MG/1
50 TABLET ORAL
Qty: 30 TABLET | Refills: 2 | Status: SHIPPED | OUTPATIENT
Start: 2025-06-23

## 2025-06-23 NOTE — PROGRESS NOTES
Name: Leonela Cali      : 1980      MRN: [id number]  Encounter Provider: Leland Bernabe DO  Encounter Date: 2025   Encounter department: [facility] [place]  :  Assessment & Plan  Insomnia  Patient with history of insomnia in which she wakes up from sleep around 3 AM and unable to fall back asleep  Patient was initially trying milligram without improvement  Patient tried melatonin without improvement  Patient was prescribed trazodone during the previous visit  Patient reported improvement  Will continue trazodone    Orders:    traZODone (DESYREL) 50 mg tablet; Take 1 tablet (50 mg total) by mouth daily at bedtime    Vitamin D deficiency  Patient has history of vitamin D deficiency  Continue vitamin D supplement  Orders:    Cholecalciferol (VITAMIN D3) 1,000 units tablet; Take 1 tablet (1,000 Units total) by mouth daily    Vitamin B12 deficiency  Patient with vitamin B12 deficiency  Continue with B12 supplement  Orders:    vitamin B-12 (VITAMIN B-12) 1,000 mcg tablet; Take 1 tablet (1,000 mcg total) by mouth daily           History of Present Illness   HPI  44-year-old female patient with no known past medical history who presents to clinic for follow-up.  Patient was seen recently in the clinic due to neck pain with radicular symptoms to the upper extremities.  Patient was found to have vitamin D and vitamin B12 deficiency.  CT cervical spine without acute abnormality.  Patient reports improvement.  Patient also was seen due to insomnia which she was prescribed trazodone with improvement.  Review of Systems   Constitutional:  Negative for activity change, appetite change, chills, diaphoresis, fatigue and fever.   HENT:  Negative for congestion, dental problem, drooling, facial swelling, mouth sores, nosebleeds, sinus pain, sneezing, sore throat and trouble swallowing.    Eyes:  Negative for pain, discharge, itching and visual disturbance.   Respiratory:  Negative  "for apnea, cough, choking, chest tightness and stridor.    Cardiovascular:  Negative for chest pain, palpitations and leg swelling.   Gastrointestinal:  Negative for blood in stool, constipation, diarrhea, rectal pain and vomiting.   Endocrine: Negative for heat intolerance and polyuria.   Genitourinary:  Negative for dyspareunia, enuresis, hematuria, pelvic pain and urgency.   Musculoskeletal:  Negative for arthralgias, joint swelling and myalgias.   Skin:  Negative for color change, pallor and rash.   Allergic/Immunologic: Negative for environmental allergies, food allergies and immunocompromised state.   Neurological:  Negative for dizziness, tremors, seizures, light-headedness and headaches.   Hematological:  Negative for adenopathy. Does not bruise/bleed easily.   Psychiatric/Behavioral:  Negative for decreased concentration, hallucinations and suicidal ideas. The patient is not hyperactive.        Objective   /72 (BP Location: Right arm, Patient Position: Sitting, Cuff Size: Standard)   Pulse 60   Temp 98.4 °F (36.9 °C) (Tympanic)   Resp 18   Ht 5' 1\" (1.549 m)   Wt 70.3 kg (155 lb)   SpO2 97%   BMI 29.29 kg/m²      Physical Exam  Constitutional:       General: She is not in acute distress.     Appearance: She is not ill-appearing, toxic-appearing or diaphoretic.   HENT:      Head: Normocephalic and atraumatic.     Cardiovascular:      Rate and Rhythm: Normal rate and regular rhythm.      Pulses: Normal pulses.      Heart sounds: Normal heart sounds. No murmur heard.     No friction rub. No gallop.   Pulmonary:      Effort: Pulmonary effort is normal. No respiratory distress.      Breath sounds: No stridor. No wheezing, rhonchi or rales.   Chest:      Chest wall: No tenderness.   Abdominal:      General: Abdomen is flat. There is no distension.      Palpations: There is no mass.      Tenderness: There is no abdominal tenderness. There is no guarding or rebound.      Hernia: No hernia is present. "     Musculoskeletal:         General: No swelling, tenderness, deformity or signs of injury. Normal range of motion.     Skin:     General: Skin is warm.      Coloration: Skin is not jaundiced or pale.      Findings: No bruising or erythema.     Neurological:      General: No focal deficit present.      Mental Status: She is alert.      Cranial Nerves: No cranial nerve deficit.      Sensory: No sensory deficit.      Motor: No weakness.      Coordination: Coordination normal.     Psychiatric:         Mood and Affect: Mood normal.         Behavior: Behavior normal.         Thought Content: Thought content normal.         Judgment: Judgment normal.       Administrative Statements   I have spent a total time of 30 minutes in caring for this patient on the day of the visit/encounter including Diagnostic results, Prognosis, and Risks and benefits of tx options.